# Patient Record
Sex: MALE | Race: WHITE | Employment: OTHER | URBAN - METROPOLITAN AREA
[De-identification: names, ages, dates, MRNs, and addresses within clinical notes are randomized per-mention and may not be internally consistent; named-entity substitution may affect disease eponyms.]

---

## 2024-09-27 ENCOUNTER — APPOINTMENT (OUTPATIENT)
Dept: GENERAL RADIOLOGY | Facility: HOSPITAL | Age: 69
End: 2024-09-27
Payer: MEDICARE

## 2024-09-27 ENCOUNTER — HOSPITAL ENCOUNTER (EMERGENCY)
Facility: HOSPITAL | Age: 69
Discharge: HOME | End: 2024-09-28
Attending: EMERGENCY MEDICINE
Payer: MEDICARE

## 2024-09-27 DIAGNOSIS — M25.561 ACUTE PAIN OF BOTH KNEES: ICD-10-CM

## 2024-09-27 DIAGNOSIS — M25.562 ACUTE PAIN OF BOTH KNEES: ICD-10-CM
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
## 2024-09-27 PROCEDURE — 73560 X-RAY EXAM OF KNEE 1 OR 2: CPT

## 2024-09-27 PROCEDURE — 99284 EMERGENCY DEPT VISIT MOD MDM: CPT

## 2024-09-27 PROCEDURE — 93010 ELECTROCARDIOGRAM REPORT: CPT

## 2024-09-27 PROCEDURE — 93005 ELECTROCARDIOGRAM TRACING: CPT

## 2024-09-28 VITALS
WEIGHT: 200 LBS | TEMPERATURE: 99 F | HEIGHT: 66 IN | BODY MASS INDEX: 32.14 KG/M2 | HEART RATE: 115 BPM | DIASTOLIC BLOOD PRESSURE: 99 MMHG | SYSTOLIC BLOOD PRESSURE: 194 MMHG | RESPIRATION RATE: 20 BRPM | OXYGEN SATURATION: 95 %

## 2024-09-28 NOTE — ED INITIAL ASSESSMENT (HPI)
Pt reports \"I lost my balance\" in my room and fell. Pt denies head injury or LOC. Pt reports bilateral knee pain.

## 2024-09-28 NOTE — ED PROVIDER NOTES
Patient Seen in: [facility] Emergency Department    History     Chief Complaint   Patient presents with    Trauma       HPI    History is provided by patient/independent historian: patient, EMS  69 year old male with history of dementia, diabetes, hypertension, hyperlipidemia, here after losing his balance and falling.  Patient denies any head injury or loss consciousness.  He reports bilateral knee pain.  He was ambulatory after the fall per EMS    History reviewed.   Past Medical History:    Dementia (HCC)    Diabetes (HCC)    Essential hypertension    Hyperlipidemia         History reviewed. History reviewed. No pertinent surgical history.      Home Medications reviewed :  (Not in a hospital admission)        History reviewed.   Social History     Socioeconomic History    Marital status:    Tobacco Use    Smoking status: Former     Types: Cigarettes    Smokeless tobacco: Former   Vaping Use    Vaping status: Never Used   Substance and Sexual Activity    Alcohol use: Not Currently    Drug use: Not Currently     Types: Heroin, Cannabis, Opiods         ROS  Review of Systems   Respiratory:  Negative for shortness of breath.    Cardiovascular:  Negative for chest pain.   Musculoskeletal:  Positive for arthralgias.   All other systems reviewed and are negative.     All other pertinent organ systems are reviewed and are negative.      Physical Exam     ED Triage Vitals [09/27/24 2100]   BP (!) 166/86   Pulse 103   Resp 20   Temp 98.5 °F (36.9 °C)   Temp src Oral   SpO2 95 %   O2 Device None (Room air)     Vital signs reviewed.      Physical Exam  Vitals and nursing note reviewed.   Cardiovascular:      Pulses: Normal pulses.   Pulmonary:      Effort: No respiratory distress.   Abdominal:      General: There is no distension.      Comments: Ventral hernia   Musculoskeletal:      Comments: Right knee abrasion, ambulatory without assistance, nontender to palpation, full range of motion   Neurological:       Mental Status: He is alert.         ED Course       Labs:   Labs Reviewed - No data to display      My EKG Interpretation: EKG    Rate, intervals and axes as noted on EKG Report.  Rate: 94  Rhythm: Sinus Rhythm  Reading: normal for rate, normal for rhythm, no acute ST changes           As reviewed and Interpreted by me      Imaging Results Available and Reviewed while in ED:   XR KNEE (1 OR 2 VIEWS), RIGHT (CPT=73560)    Result Date: 9/27/2024  CONCLUSION:  1. No acute fracture or subluxation.    Dictated by (CST): Syed Choi MD on 9/27/2024 at 9:52 PM     Finalized by (CST): Syed Choi MD on 9/27/2024 at 9:53 PM          XR KNEE (1 OR 2 VIEWS), LEFT (CPT=73560)    Result Date: 9/27/2024  CONCLUSION:  1. No acute fracture or subluxation.    Dictated by (CST): Syed Choi MD on 9/27/2024 at 9:51 PM     Finalized by (CST): Syed Choi MD on 9/27/2024 at 9:52 PM         My review and independent interpretation of XR images: no fracture. Radiology report corroborates this in addition to other details as reported by them.      Decision rules/scores evaluated: none      Diagnostic labs/tests considered but not ordered: CBC< BMP, type and screen    ED Medications Administered: Medications - No data to display             MDM       Medical Decision Making      Differential Diagnosis: After obtaining the patient's history, performing the physical exam and reviewing the diagnostics, multiple initial diagnoses were considered based on the presenting problem including fall, fracture, contusion, dislocation    External document review: I personally reviewed available external medical records for any recent pertinent discharge summaries, testing, and procedures - the findings are as follows: none available    Complicating Factors: The patient already  has a past medical history of Dementia (HCC), Diabetes (HCC), Essential hypertension, and Hyperlipidemia. to contribute to the complexity of this ED  evaluation.    Procedures performed: none    Discussed management with physician/appropriate source: none    Considered admission/deescalation of care for: none    Social determinants of health affecting patient care: none    Prescription medications considered: discussed continuing current medication regimen    The patient requires continuous monitoring for: knee pain    Shared decision making: discussed possible admission      Disposition and Plan     Clinical Impression:  1. Fall, initial encounter    2. Acute pain of both knees        Disposition:  Discharge    Follow-up:  Itzel Ordoñez MD  [address] [place] [id number]  [phone]    Follow up        Medications Prescribed:  There are no discharge medications for this patient.

## 2024-09-29 LAB
ATRIAL RATE: 94 BPM
P AXIS: 41 DEGREES
P-R INTERVAL: 184 MS
Q-T INTERVAL: 356 MS
QRS DURATION: 74 MS
QTC CALCULATION (BEZET): 445 MS
R AXIS: 8 DEGREES
T AXIS: 72 DEGREES
VENTRICULAR RATE: 94 BPM